# Patient Record
Sex: MALE | Race: WHITE | NOT HISPANIC OR LATINO | URBAN - METROPOLITAN AREA
[De-identification: names, ages, dates, MRNs, and addresses within clinical notes are randomized per-mention and may not be internally consistent; named-entity substitution may affect disease eponyms.]

---

## 2022-01-24 ENCOUNTER — NEW PATIENT (OUTPATIENT)
Dept: URBAN - METROPOLITAN AREA CLINIC 14 | Facility: CLINIC | Age: 77
End: 2022-01-24

## 2022-01-24 DIAGNOSIS — H25.13: ICD-10-CM

## 2022-01-24 PROCEDURE — 92136 OPHTHALMIC BIOMETRY: CPT

## 2022-01-24 PROCEDURE — 99204 OFFICE O/P NEW MOD 45 MIN: CPT

## 2022-01-24 ASSESSMENT — VISUAL ACUITY
OS_BCVA: 20/50
OD_CC: CF 2FT
OS_CC: 20/60

## 2022-01-24 ASSESSMENT — KERATOMETRY
OD_AXISANGLE_DEGREES: 106
OS_K2POWER_DIOPTERS: 44.25
OS_AXISANGLE2_DEGREES: 165
OD_K2POWER_DIOPTERS: 44.50
OD_AXISANGLE2_DEGREES: 16
OS_K1POWER_DIOPTERS: 43.00
OD_K1POWER_DIOPTERS: 43.75
OS_AXISANGLE_DEGREES: 75

## 2022-01-24 ASSESSMENT — TONOMETRY
OS_IOP_MMHG: 14
OD_IOP_MMHG: 13

## 2022-01-24 NOTE — PATIENT DISCUSSION
Patient understands that due to his dense cataract in [email or web address] right eye his vision may be limited after surgery. (no view to retina, B-scan done today).

## 2022-02-16 ENCOUNTER — POST-OP (OUTPATIENT)
Dept: URBAN - METROPOLITAN AREA CLINIC 14 | Facility: CLINIC | Age: 77
End: 2022-02-16

## 2022-02-16 DIAGNOSIS — Z96.1: ICD-10-CM

## 2022-02-16 PROCEDURE — 99024 POSTOP FOLLOW-UP VISIT: CPT

## 2022-02-16 ASSESSMENT — KERATOMETRY
OS_AXISANGLE_DEGREES: 75
OS_K1POWER_DIOPTERS: 43.00
OD_AXISANGLE2_DEGREES: 16
OD_K1POWER_DIOPTERS: 43.75
OS_K2POWER_DIOPTERS: 44.25
OD_AXISANGLE_DEGREES: 106
OD_K2POWER_DIOPTERS: 44.50
OS_AXISANGLE2_DEGREES: 165

## 2022-02-16 ASSESSMENT — VISUAL ACUITY
OS_SC: 20/400
OS_PH: 20/40-1
OD_SC: 20/40

## 2022-02-16 ASSESSMENT — TONOMETRY
OD_IOP_MMHG: 20
OS_IOP_MMHG: 18

## 2022-02-23 ENCOUNTER — POST-OP (OUTPATIENT)
Dept: URBAN - METROPOLITAN AREA CLINIC 14 | Facility: CLINIC | Age: 77
End: 2022-02-23

## 2022-02-23 DIAGNOSIS — Z98.890: ICD-10-CM

## 2022-02-23 PROCEDURE — 99024 POSTOP FOLLOW-UP VISIT: CPT

## 2022-02-23 ASSESSMENT — VISUAL ACUITY
OD_SC: 20/40
OS_SC: 20/25

## 2022-02-23 ASSESSMENT — KERATOMETRY
OD_AXISANGLE2_DEGREES: 16
OS_K2POWER_DIOPTERS: 44.25
OS_AXISANGLE2_DEGREES: 165
OD_K1POWER_DIOPTERS: 43.75
OD_AXISANGLE_DEGREES: 106
OS_K1POWER_DIOPTERS: 43.00
OS_AXISANGLE_DEGREES: 75
OD_K2POWER_DIOPTERS: 44.50

## 2022-02-23 ASSESSMENT — TONOMETRY
OS_IOP_MMHG: 27
OD_IOP_MMHG: 21

## 2022-03-29 ENCOUNTER — POST-OP (OUTPATIENT)
Dept: URBAN - METROPOLITAN AREA CLINIC 9 | Facility: CLINIC | Age: 77
End: 2022-03-29

## 2022-03-29 DIAGNOSIS — Z96.1: ICD-10-CM

## 2022-03-29 DIAGNOSIS — Z98.890: ICD-10-CM

## 2022-03-29 PROCEDURE — 99024 POSTOP FOLLOW-UP VISIT: CPT

## 2022-03-29 ASSESSMENT — KERATOMETRY
OD_AXISANGLE2_DEGREES: 16
OS_K2POWER_DIOPTERS: 44.25
OD_K1POWER_DIOPTERS: 43.75
OD_AXISANGLE_DEGREES: 106
OS_K1POWER_DIOPTERS: 43.00
OS_AXISANGLE_DEGREES: 75
OS_AXISANGLE2_DEGREES: 165
OD_K2POWER_DIOPTERS: 44.50

## 2022-03-29 ASSESSMENT — VISUAL ACUITY
OS_SC: 20/30
OD_SC: 20/40

## 2022-03-29 ASSESSMENT — TONOMETRY
OS_IOP_MMHG: 19
OD_IOP_MMHG: 16